# Patient Record
Sex: MALE | Race: ASIAN | NOT HISPANIC OR LATINO
[De-identification: names, ages, dates, MRNs, and addresses within clinical notes are randomized per-mention and may not be internally consistent; named-entity substitution may affect disease eponyms.]

---

## 2021-05-13 PROBLEM — Z00.00 ENCOUNTER FOR PREVENTIVE HEALTH EXAMINATION: Status: ACTIVE | Noted: 2021-05-13

## 2021-06-14 ENCOUNTER — APPOINTMENT (OUTPATIENT)
Dept: GASTROENTEROLOGY | Facility: CLINIC | Age: 36
End: 2021-06-14
Payer: COMMERCIAL

## 2021-06-14 VITALS
SYSTOLIC BLOOD PRESSURE: 138 MMHG | WEIGHT: 188 LBS | DIASTOLIC BLOOD PRESSURE: 88 MMHG | HEIGHT: 73 IN | HEART RATE: 75 BPM | BODY MASS INDEX: 24.92 KG/M2

## 2021-06-14 DIAGNOSIS — Z86.010 PERSONAL HISTORY OF COLONIC POLYPS: ICD-10-CM

## 2021-06-14 DIAGNOSIS — K59.00 CONSTIPATION, UNSPECIFIED: ICD-10-CM

## 2021-06-14 DIAGNOSIS — K92.1 MELENA: ICD-10-CM

## 2021-06-14 PROCEDURE — 99202 OFFICE O/P NEW SF 15 MIN: CPT

## 2021-06-14 PROCEDURE — 99072 ADDL SUPL MATRL&STAF TM PHE: CPT

## 2021-06-14 RX ORDER — SODIUM PICOSULFATE, MAGNESIUM OXIDE, AND ANHYDROUS CITRIC ACID 10; 3.5; 12 MG/160ML; G/160ML; G/160ML
10-3.5-12 MG-GM LIQUID ORAL
Qty: 2 | Refills: 0 | Status: ACTIVE | COMMUNITY
Start: 2021-06-14 | End: 1900-01-01

## 2021-06-23 PROBLEM — Z86.010 PERSONAL HISTORY OF COLONIC POLYPS: Status: ACTIVE | Noted: 2021-06-23

## 2021-06-23 NOTE — PHYSICAL EXAM
[General Appearance - Alert] : alert [General Appearance - In No Acute Distress] : in no acute distress [Auscultation Breath Sounds / Voice Sounds] : lungs were clear to auscultation bilaterally [Heart Rate And Rhythm] : heart rate was normal and rhythm regular [Heart Sounds Gallop] : no gallops [Heart Sounds] : normal S1 and S2 [Murmurs] : no murmurs [Heart Sounds Pericardial Friction Rub] : no pericardial rub [Bowel Sounds] : normal bowel sounds [Abdomen Soft] : soft [Abdomen Tenderness] : non-tender [] : no hepato-splenomegaly [Abdomen Mass (___ Cm)] : no abdominal mass palpated [Abnormal Walk] : normal gait [Nail Clubbing] : no clubbing  or cyanosis of the fingernails [Musculoskeletal - Swelling] : no joint swelling seen [Motor Tone] : muscle strength and tone were normal [Impaired Insight] : insight and judgment were intact [Oriented To Time, Place, And Person] : oriented to person, place, and time [Affect] : the affect was normal

## 2021-06-23 NOTE — HISTORY OF PRESENT ILLNESS
[de-identified] : 37yo male with hx colon polyps\par Review of prior notes reveals hx colon polyps 2015\par \par He is also with recent mild constipation with some increased straining and some BRB on the paper.  Bleeding is self-limited and resolves quickly\par

## 2021-06-23 NOTE — ASSESSMENT
[FreeTextEntry1] : 37yo male with hx colon polyps, constipation\par \par constipation - increase fiber, fiber gummies, increase fluid\par bleeding likely secondary to hermorhoids - increase fiber\par \par hx polyps - will check colonoscopy with clenpiq\par Risks and benefits of procedure(s) discussed with patient in detail, including but not limited to, perforation, bleeding, reaction to anesthesia, missed lesions.\par

## 2021-07-30 ENCOUNTER — APPOINTMENT (OUTPATIENT)
Dept: GASTROENTEROLOGY | Facility: AMBULATORY MEDICAL SERVICES | Age: 36
End: 2021-07-30
Payer: COMMERCIAL

## 2021-07-30 PROCEDURE — 45378 DIAGNOSTIC COLONOSCOPY: CPT

## 2022-07-27 ENCOUNTER — EMERGENCY (EMERGENCY)
Facility: HOSPITAL | Age: 37
LOS: 1 days | Discharge: HOME | End: 2022-07-27
Attending: EMERGENCY MEDICINE | Admitting: EMERGENCY MEDICINE
Payer: COMMERCIAL

## 2022-07-27 VITALS
TEMPERATURE: 98 F | SYSTOLIC BLOOD PRESSURE: 130 MMHG | HEART RATE: 87 BPM | OXYGEN SATURATION: 99 % | RESPIRATION RATE: 16 BRPM | DIASTOLIC BLOOD PRESSURE: 78 MMHG

## 2022-07-27 VITALS
RESPIRATION RATE: 16 BRPM | DIASTOLIC BLOOD PRESSURE: 79 MMHG | OXYGEN SATURATION: 100 % | TEMPERATURE: 98 F | HEART RATE: 91 BPM | SYSTOLIC BLOOD PRESSURE: 122 MMHG

## 2022-07-27 PROCEDURE — 99283 EMERGENCY DEPT VISIT LOW MDM: CPT | Mod: 25

## 2022-07-27 PROCEDURE — 73562 X-RAY EXAM OF KNEE 3: CPT

## 2022-07-27 PROCEDURE — 99283 EMERGENCY DEPT VISIT LOW MDM: CPT

## 2022-07-27 PROCEDURE — 73562 X-RAY EXAM OF KNEE 3: CPT | Mod: 26,RT

## 2022-07-27 NOTE — ED PROVIDER NOTE - NS ED ATTENDING STATEMENT MOD
This was a shared visit with the JOVAN. I reviewed and verified the documentation and independently performed the documented:

## 2022-07-27 NOTE — ED PROVIDER NOTE - NSFOLLOWUPINSTRUCTIONS_ED_ALL_ED_FT
rest, ice, elevate, support  knee immobilizer   motrin over the counter, every 8 hours with food   follow up with orthopedics if pain continues, referral provided if needed       Knee Sprain       A knee sprain is a stretch or tear in a knee ligament. Knee ligaments are tissues that connect bones in the knee to each other.      What are the causes?    This condition often results from:  •A fall.       •An injury to the knee.        What are the signs or symptoms?    Symptoms of this condition include:  •Trouble straightening or bending the leg.       •Swelling in the knee.       •Bruising around the knee.       •Tenderness or pain in the knee.       •Sudden muscle tightening (spasms) around the knee.        How is this treated?    Treatment for this condition may involve:  •Keeping the knee still (immobilized) with a cast, brace, or splint.      •Putting ice on the knee. This helps with pain and swelling.      •Raising (elevating) the knee above the level of your heart when you are resting. This helps with pain and swelling.      •Taking medicine for pain.       •Doing exercises to keep your knee from being weak or stiff.      •Having surgery. This may be needed if the ligament is completely torn.        Follow these instructions at home:    If you have a splint or brace:     •Wear it as told by your doctor. Remove it only as told by your doctor.      •Check the skin around it every day. Tell your doctor about any concerns.    •Loosen it if your toes:  •Tingle.      •Become numb.      •Turn cold and blue.        •Keep it clean and dry.      If you have a cast:     • Do not stick anything inside it to scratch your skin.      •Check the skin around it every day. Tell your doctor about any concerns.      •You may put lotion on dry skin around the edges of the cast. Do not put lotion on the skin under the cast.      •Keep it clean and dry.      Bathing     If you have a splint, brace, or cast that is not waterproof:  •Do not let it get wet.      •Cover it with a watertight covering when you take a bath or a shower.        Managing pain, stiffness, and swelling  •If told, put ice on the injured area. To do this:  •If you have a removable splint or brace, remove it as told by your doctor.      •Put ice in a plastic bag.      •Place a towel between your skin and the bag or between your cast and the bag.      •Leave the ice on for 20 minutes, 2–3 times a day.        •Move your toes often to reduce stiffness and swelling.      •Raise the injured area above the level of your heart while you are sitting or lying down.      General instructions    •Take over-the-counter and prescription medicines only as told by your doctor.      •Do not use any products that contain nicotine or tobacco, such as cigarettes, e-cigarettes, and chewing tobacco. These can delay healing. If you need help quitting, ask your doctor.      •Do exercises as told by your doctor.      •Keep all follow-up visits as told by your doctor. This is important.        Contact a doctor if:    •Your pain gets worse.      •The cast, brace, or splint does not fit right.      •The cast, brace, or splint gets damaged.        Get help right away if:    •You cannot use your knee to support your body weight (bear weight) for standing or walking.      •You cannot move the injured area.      •Your knee raul or you have pain after you walk only a few steps.      •You have very bad pain, swelling, or numbness in your leg below the cast, brace, or splint.      •Your foot or toes are numb, cold, or blue after loosening your splint or brace.        Summary    •A knee sprain is a stretch or tear in a tissue (ligament) that connects your knee bones to each other.      •You may need to wear a splint, brace, or cast to keep the knee still while it is getting better.      •Surgery may be needed if the ligament is completely torn.      This information is not intended to replace advice given to you by your health care provider. Make sure you discuss any questions you have with your health care provider.

## 2022-07-27 NOTE — ED ADULT TRIAGE NOTE - CHIEF COMPLAINT QUOTE
36 y/o male received ambulatory to triage. Alert and oriented x4. C/o "I was playing volleyball and I heard a pop in my right knee while I was playing." Pt denies any pain but states "I cant bear all my weight on it." No deformity noted. Pt able to ambulate and bear weight with minimal pain on right leg. Cap refill <2 seconds, + pulses, pt able to wiggle toes.

## 2022-07-27 NOTE — ED PROVIDER NOTE - MUSCULOSKELETAL, MLM
soft tissue tenderness to popliteal fossa of right knee. no bony tenderness. full ROM. able to SLR. no laxity appreciated

## 2022-07-27 NOTE — ED PROVIDER NOTE - OBJECTIVE STATEMENT
Otherwise healthy 37-year-old male presents with right knee pain that occurred while playing volleyball prior to arrival.  Patient was playing volleyball and heard a pop behind right knee.  Denies any pain at rest, but has a hard time putting full weight on his right knee.  Ambulatory, but with slight limp.  Has not taken anything over-the-counter for pain or symptoms.  Denies other injuries or complaints.  PCP Jorge L Godoy

## 2022-07-27 NOTE — ED PROVIDER NOTE - PATIENT PORTAL LINK FT
You can access the FollowMyHealth Patient Portal offered by [facility] by registering at the following website: [email or web address]. By joining [email or web address]’s FollowMyHealth portal, you will also be able to view your health information using other applications (apps) compatible with our system.

## 2022-07-27 NOTE — ED PROVIDER NOTE - PROGRESS NOTE DETAILS
Knee immobilizer applied by tech.  Post application shows good alignment, neurovascularly intact.  Ambulatory with steady gait.  Recommend rest, ice, compression, elevation, and support.  Recommend Motrin over-the-counter for pain and inflammation with food.  X-ray results discussed with patient.  No evidence of acute fracture.  Recommend follow-up with orthopedics if pain continues or fails to improve.  May need outpatient MRI if pain continues.

## 2022-07-27 NOTE — ED PROVIDER NOTE - ATTENDING APP SHARED VISIT CONTRIBUTION OF CARE
37-year-old male with right knee injury status post playing volleyball jumping up and landing on his knee patient states he heard a popping sensation.  Patient is alert and oriented no acute distress able to range knee in flexion and extension, anterior drawer test is negative, patient has difficulty with bearing weight, will follow-up with x-rays and place brace and have patient follow-up with outpatient orthopedics.

## 2022-07-27 NOTE — ED ADULT NURSE NOTE - CHIEF COMPLAINT QUOTE
38 y/o male received ambulatory to triage. Alert and oriented x4. C/o "I was playing volleyball and I heard a pop in my right knee while I was playing." Pt denies any pain but states "I cant bear all my weight on it." No deformity noted. Pt able to ambulate and bear weight with minimal pain on right leg. Cap refill <2 seconds, + pulses, pt able to wiggle toes.

## 2022-07-27 NOTE — ED ADULT NURSE NOTE - OBJECTIVE STATEMENT
36 y/o male received ambulatory to triage. Alert and oriented x4. C/o right knee pain while playing volleyball.

## 2022-07-27 NOTE — ED ADULT NURSE NOTE - NSFALLRSKUNASSIST_ED_ALL_ED
Ventricular Rate : 105  Atrial Rate : 105  P-R Interval : 128  QRS Duration : 81  Q-T Interval : 339  QTC Calculation(Bezet) : 448  P Axis : 43  R Axis : 25  T Axis : 33  Diagnosis : Sinus tachycardia    Confirmed by Alejandro Tsai ([id number]) on 3/8/2019 9:10:31 AM   no

## 2024-09-27 ENCOUNTER — APPOINTMENT (OUTPATIENT)
Age: 39
End: 2024-09-27

## 2024-09-27 DIAGNOSIS — Z23 ENCOUNTER FOR IMMUNIZATION: ICD-10-CM

## 2024-09-27 NOTE — REASON FOR VISIT
[FreeTextEntry1] :  Patient presents for flu vaccination. VIS provided;consent signed and uploaded to chart.

## 2024-10-02 ENCOUNTER — APPOINTMENT (OUTPATIENT)
Age: 39
End: 2024-10-02

## 2024-10-02 VITALS — RESPIRATION RATE: 100 BRPM | DIASTOLIC BLOOD PRESSURE: 82 MMHG | SYSTOLIC BLOOD PRESSURE: 126 MMHG | HEART RATE: 79 BPM

## 2024-10-02 DIAGNOSIS — Z00.00 ENCOUNTER FOR GENERAL ADULT MEDICAL EXAMINATION W/OUT ABNORMAL FINDINGS: ICD-10-CM

## 2024-10-02 NOTE — REASON FOR VISIT
[FreeTextEntry1] : Patient presents for wellness visit;blood pressure screen.  Education provided.  Follow up with PCP annually and as needed.

## 2024-10-16 ENCOUNTER — APPOINTMENT (OUTPATIENT)
Age: 39
End: 2024-10-16

## 2024-10-16 VITALS — DIASTOLIC BLOOD PRESSURE: 62 MMHG | SYSTOLIC BLOOD PRESSURE: 122 MMHG

## 2024-10-16 DIAGNOSIS — Z00.00 ENCOUNTER FOR GENERAL ADULT MEDICAL EXAMINATION W/OUT ABNORMAL FINDINGS: ICD-10-CM

## 2024-10-17 LAB
CHOLEST SERPL-MCNC: 167 MG/DL
ESTIMATED AVERAGE GLUCOSE: 105 MG/DL
HBA1C MFR BLD HPLC: 5.3 %
HDLC SERPL-MCNC: 41 MG/DL
LDLC SERPL CALC-MCNC: 111 MG/DL
NONHDLC SERPL-MCNC: 126 MG/DL
TRIGL SERPL-MCNC: 81 MG/DL

## 2025-04-24 ENCOUNTER — APPOINTMENT (OUTPATIENT)
Dept: GASTROENTEROLOGY | Facility: CLINIC | Age: 40
End: 2025-04-24
Payer: COMMERCIAL

## 2025-04-24 ENCOUNTER — NON-APPOINTMENT (OUTPATIENT)
Age: 40
End: 2025-04-24

## 2025-04-24 VITALS
HEIGHT: 73 IN | DIASTOLIC BLOOD PRESSURE: 80 MMHG | SYSTOLIC BLOOD PRESSURE: 124 MMHG | WEIGHT: 185 LBS | BODY MASS INDEX: 24.52 KG/M2

## 2025-04-24 DIAGNOSIS — Z09 ENCOUNTER FOR FOLLOW-UP EXAMINATION AFTER COMPLETED TREATMENT FOR CONDITIONS OTHER THAN MALIGNANT NEOPLASM: ICD-10-CM

## 2025-04-24 DIAGNOSIS — Z12.11 ENCOUNTER FOR SCREENING FOR MALIGNANT NEOPLASM OF COLON: ICD-10-CM

## 2025-04-24 DIAGNOSIS — Z86.0100 PERSONAL HISTORY OF COLON POLYPS, UNSPECIFIED: ICD-10-CM

## 2025-04-24 PROCEDURE — 99202 OFFICE O/P NEW SF 15 MIN: CPT

## 2025-04-25 PROBLEM — Z86.0100 HISTORY OF COLON POLYPS: Status: ACTIVE | Noted: 2021-06-23

## 2025-07-21 ENCOUNTER — APPOINTMENT (OUTPATIENT)
Age: 40
End: 2025-07-21

## 2025-07-21 VITALS
SYSTOLIC BLOOD PRESSURE: 143 MMHG | TEMPERATURE: 98.2 F | OXYGEN SATURATION: 100 % | DIASTOLIC BLOOD PRESSURE: 91 MMHG | HEART RATE: 67 BPM